# Patient Record
Sex: FEMALE | Race: WHITE
[De-identification: names, ages, dates, MRNs, and addresses within clinical notes are randomized per-mention and may not be internally consistent; named-entity substitution may affect disease eponyms.]

---

## 2020-12-01 ENCOUNTER — HOSPITAL ENCOUNTER (OUTPATIENT)
Dept: HOSPITAL 95 - PLD | Age: 80
Discharge: HOME | End: 2020-12-01
Attending: DERMATOLOGY
Payer: MEDICARE

## 2020-12-01 DIAGNOSIS — D22.71: Primary | ICD-10-CM

## 2021-03-04 ENCOUNTER — HOSPITAL ENCOUNTER (EMERGENCY)
Dept: HOSPITAL 95 - ER | Age: 81
Discharge: HOME | End: 2021-03-04
Payer: MEDICARE

## 2021-03-04 VITALS — HEIGHT: 66 IN | BODY MASS INDEX: 27.32 KG/M2 | WEIGHT: 170 LBS

## 2021-03-04 DIAGNOSIS — Z79.82: ICD-10-CM

## 2021-03-04 DIAGNOSIS — Z79.899: ICD-10-CM

## 2021-03-04 DIAGNOSIS — E78.5: ICD-10-CM

## 2021-03-04 DIAGNOSIS — R07.9: Primary | ICD-10-CM

## 2021-03-04 DIAGNOSIS — I25.10: ICD-10-CM

## 2021-03-04 DIAGNOSIS — Z88.8: ICD-10-CM

## 2021-03-04 DIAGNOSIS — I10: ICD-10-CM

## 2021-03-04 DIAGNOSIS — E03.9: ICD-10-CM

## 2021-03-04 LAB
ALBUMIN SERPL BCP-MCNC: 3.7 G/DL (ref 3.4–5)
ALBUMIN/GLOB SERPL: 1 {RATIO} (ref 0.8–1.8)
ALT SERPL W P-5'-P-CCNC: 18 U/L (ref 12–78)
ANION GAP SERPL CALCULATED.4IONS-SCNC: 6 MMOL/L (ref 6–16)
AST SERPL W P-5'-P-CCNC: 18 U/L (ref 12–37)
BASOPHILS # BLD AUTO: 0.04 K/MM3 (ref 0–0.23)
BASOPHILS NFR BLD AUTO: 1 % (ref 0–2)
BILIRUB SERPL-MCNC: 0.3 MG/DL (ref 0.1–1)
BUN SERPL-MCNC: 19 MG/DL (ref 8–24)
CALCIUM SERPL-MCNC: 10.1 MG/DL (ref 8.5–10.1)
CHLORIDE SERPL-SCNC: 106 MMOL/L (ref 98–108)
CO2 SERPL-SCNC: 29 MMOL/L (ref 21–32)
CREAT SERPL-MCNC: 0.75 MG/DL (ref 0.4–1)
DEPRECATED RDW RBC AUTO: 47.3 FL (ref 35.1–46.3)
EOSINOPHIL # BLD AUTO: 0.18 K/MM3 (ref 0–0.68)
EOSINOPHIL NFR BLD AUTO: 2 % (ref 0–6)
ERYTHROCYTE [DISTWIDTH] IN BLOOD BY AUTOMATED COUNT: 13.6 % (ref 11.7–14.2)
GLOBULIN SER CALC-MCNC: 3.6 G/DL (ref 2.2–4)
GLUCOSE SERPL-MCNC: 88 MG/DL (ref 70–99)
HCT VFR BLD AUTO: 42.1 % (ref 33–51)
HGB BLD-MCNC: 13.7 G/DL (ref 11.5–16)
IMM GRANULOCYTES # BLD AUTO: 0.02 K/MM3 (ref 0–0.1)
IMM GRANULOCYTES NFR BLD AUTO: 0 % (ref 0–1)
LYMPHOCYTES # BLD AUTO: 3.99 K/MM3 (ref 0.84–5.2)
LYMPHOCYTES NFR BLD AUTO: 47 % (ref 21–46)
MCHC RBC AUTO-ENTMCNC: 32.5 G/DL (ref 31.5–36.5)
MCV RBC AUTO: 94 FL (ref 80–100)
MONOCYTES # BLD AUTO: 0.68 K/MM3 (ref 0.16–1.47)
MONOCYTES NFR BLD AUTO: 8 % (ref 4–13)
NEUTROPHILS # BLD AUTO: 3.64 K/MM3 (ref 1.96–9.15)
NEUTROPHILS NFR BLD AUTO: 43 % (ref 41–73)
NRBC # BLD AUTO: 0 K/MM3 (ref 0–0.02)
NRBC BLD AUTO-RTO: 0 /100 WBC (ref 0–0.2)
PLATELET # BLD AUTO: 275 K/MM3 (ref 150–400)
POTASSIUM SERPL-SCNC: 3.8 MMOL/L (ref 3.5–5.5)
PROT SERPL-MCNC: 7.3 G/DL (ref 6.4–8.2)
SODIUM SERPL-SCNC: 141 MMOL/L (ref 136–145)
TROPONIN I SERPL-MCNC: <0.015 NG/ML (ref 0–0.04)

## 2022-01-05 ENCOUNTER — HOSPITAL ENCOUNTER (EMERGENCY)
Dept: HOSPITAL 95 - ER | Age: 82
LOS: 1 days | Discharge: HOME | End: 2022-01-06
Payer: MEDICARE

## 2022-01-05 VITALS — BODY MASS INDEX: 26.52 KG/M2 | WEIGHT: 164.99 LBS | HEIGHT: 66 IN

## 2022-01-05 DIAGNOSIS — Z88.8: ICD-10-CM

## 2022-01-05 DIAGNOSIS — I10: ICD-10-CM

## 2022-01-05 DIAGNOSIS — Z86.73: ICD-10-CM

## 2022-01-05 DIAGNOSIS — Z79.82: ICD-10-CM

## 2022-01-05 DIAGNOSIS — Z79.899: ICD-10-CM

## 2022-01-05 DIAGNOSIS — E03.9: ICD-10-CM

## 2022-01-05 DIAGNOSIS — N12: Primary | ICD-10-CM

## 2022-01-05 LAB
ALBUMIN SERPL BCP-MCNC: 3.5 G/DL (ref 3.4–5)
ALBUMIN/GLOB SERPL: 0.9 {RATIO} (ref 0.8–1.8)
ALT SERPL W P-5'-P-CCNC: 16 U/L (ref 12–78)
ANION GAP SERPL CALCULATED.4IONS-SCNC: 7 MMOL/L (ref 6–16)
AST SERPL W P-5'-P-CCNC: 25 U/L (ref 12–37)
BASOPHILS # BLD AUTO: 0.03 K/MM3 (ref 0–0.23)
BASOPHILS NFR BLD AUTO: 0 % (ref 0–2)
BILIRUB SERPL-MCNC: 0.4 MG/DL (ref 0.1–1)
BUN SERPL-MCNC: 22 MG/DL (ref 8–24)
CALCIUM SERPL-MCNC: 9.8 MG/DL (ref 8.5–10.1)
CHLORIDE SERPL-SCNC: 107 MMOL/L (ref 98–108)
CO2 SERPL-SCNC: 28 MMOL/L (ref 21–32)
CREAT SERPL-MCNC: 0.7 MG/DL (ref 0.4–1)
DEPRECATED RDW RBC AUTO: 48.8 FL (ref 35.1–46.3)
EOSINOPHIL # BLD AUTO: 0.07 K/MM3 (ref 0–0.68)
EOSINOPHIL NFR BLD AUTO: 1 % (ref 0–6)
ERYTHROCYTE [DISTWIDTH] IN BLOOD BY AUTOMATED COUNT: 14 % (ref 11.7–14.2)
GLOBULIN SER CALC-MCNC: 3.9 G/DL (ref 2.2–4)
GLUCOSE SERPL-MCNC: 113 MG/DL (ref 70–99)
HCT VFR BLD AUTO: 43.6 % (ref 33–51)
HGB BLD-MCNC: 13.9 G/DL (ref 11.5–16)
IMM GRANULOCYTES # BLD AUTO: 0.05 K/MM3 (ref 0–0.1)
IMM GRANULOCYTES NFR BLD AUTO: 1 % (ref 0–1)
LYMPHOCYTES # BLD AUTO: 3.5 K/MM3 (ref 0.84–5.2)
LYMPHOCYTES NFR BLD AUTO: 37 % (ref 21–46)
MCHC RBC AUTO-ENTMCNC: 31.9 G/DL (ref 31.5–36.5)
MCV RBC AUTO: 95 FL (ref 80–100)
MONOCYTES # BLD AUTO: 0.77 K/MM3 (ref 0.16–1.47)
MONOCYTES NFR BLD AUTO: 8 % (ref 4–13)
NEUTROPHILS # BLD AUTO: 4.99 K/MM3 (ref 1.96–9.15)
NEUTROPHILS NFR BLD AUTO: 53 % (ref 41–73)
NRBC # BLD AUTO: 0 K/MM3 (ref 0–0.02)
NRBC BLD AUTO-RTO: 0 /100 WBC (ref 0–0.2)
PLATELET # BLD AUTO: 316 K/MM3 (ref 150–400)
POTASSIUM SERPL-SCNC: 4 MMOL/L (ref 3.5–5.5)
PROT SERPL-MCNC: 7.4 G/DL (ref 6.4–8.2)
PROT UR STRIP-MCNC: (no result) MG/DL
RBC #/AREA URNS HPF: (no result) /HPF (ref 0–2)
SODIUM SERPL-SCNC: 142 MMOL/L (ref 136–145)
SP GR SPEC: 1.03 (ref 1–1.02)
UROBILINOGEN UR STRIP-MCNC: (no result) MG/DL
WBC #/AREA URNS HPF: (no result) /HPF (ref 0–5)

## 2022-01-05 PROCEDURE — A9270 NON-COVERED ITEM OR SERVICE: HCPCS

## 2022-04-25 ENCOUNTER — HOSPITAL ENCOUNTER (OUTPATIENT)
Dept: HOSPITAL 95 - ORSCMMR | Age: 82
LOS: 1 days | Discharge: HOME | End: 2022-04-26
Attending: OBSTETRICS & GYNECOLOGY
Payer: MEDICARE

## 2022-04-25 VITALS — BODY MASS INDEX: 27.49 KG/M2 | HEIGHT: 66 IN | WEIGHT: 171.08 LBS

## 2022-04-25 DIAGNOSIS — N81.5: ICD-10-CM

## 2022-04-25 DIAGNOSIS — N81.10: Primary | ICD-10-CM

## 2022-04-25 DIAGNOSIS — N81.6: ICD-10-CM

## 2022-04-25 DIAGNOSIS — K21.9: ICD-10-CM

## 2022-04-25 DIAGNOSIS — I10: ICD-10-CM

## 2022-04-25 DIAGNOSIS — Z79.899: ICD-10-CM

## 2022-04-25 DIAGNOSIS — E03.9: ICD-10-CM

## 2022-04-25 DIAGNOSIS — G47.33: ICD-10-CM

## 2022-04-25 DIAGNOSIS — I25.2: ICD-10-CM

## 2022-04-25 PROCEDURE — 0UQF0ZZ REPAIR CUL-DE-SAC, OPEN APPROACH: ICD-10-PCS | Performed by: OBSTETRICS & GYNECOLOGY

## 2022-04-25 PROCEDURE — 0JQC0ZZ REPAIR PELVIC REGION SUBCUTANEOUS TISSUE AND FASCIA, OPEN APPROACH: ICD-10-PCS | Performed by: OBSTETRICS & GYNECOLOGY

## 2022-04-25 PROCEDURE — A9270 NON-COVERED ITEM OR SERVICE: HCPCS

## 2022-04-25 PROCEDURE — C1771 REP DEV, URINARY, W/SLING: HCPCS

## 2022-04-25 NOTE — NUR
MEDICATED FOR C/O 6/10 PAIN ON LOWER ABD, KPAD GIVEN, TOLERATING CLEAR LIQUIDS
AND SOME REGULAR FOOD WELL, SMALL AMOUNT OF SEROUSANGUINOUS DRAINAGE NOTED ON
PERIPAD, INMAN CATH IN PLACE DRAINING C/Y/U, VSS, CONT. TO MONITOR FOR ANY
CHANGES.

## 2022-04-26 NOTE — NUR
POD 1 S/P A&P REPAIR. PT VSS, PAIN MGD PER EMAR. PT HAD SMALL AMT VAGINAL
BLEEDING, CALVIN PAD CHANGED X2. PT SHANNON REG PO, NO N/V, REP +FLATUS. INMAN D/C
THIS AM, AWAITING VOIDING TRIAL.

## 2022-04-26 NOTE — NUR
WRITTEN AND VERBAL DISCHARGE INSTRUCTIONS PROVIDED, PT REPORTS UNDERSTANDING.
QUESTIONS ANSWERED.  WILL MONITOR UNTIL DISCHARGE.

## 2022-04-26 NOTE — NUR
DISCHARGE
PT DISCHARGED HOME WITH FAMILY MEMBER AT 1345.  PT ALERT AND ORIENTED AT TIME
OF DISCHARGE.  SHE WAS ABLE TO VOID WITH PVR OF 11ML AFTER VOID.  PT ABLE TO
AMBULATE.  TOLERATING PO AND PAIN MANAGED BEFORE DISCHARGE.  PT ESCORTED OUT
IN A W/C.

## 2022-04-26 NOTE — NUR
PATIENT A&0X4 VSS, PT COOPERATIVE AND RECEPTIVE W/CARE PROVIDED. REPORTED
SHORT BOUT OF HEARTBURN AND STATED IT WAS FROM RECENT TOMATO JUICE. ABDOMEN
TENDER UPON PALPATION. REPORTED MODERATE TO SEVERE PAIN T/O SHIFT, MEDICATED
PER EMAR AND GAYLE RASCON. ABLE TO AMBULATE WITH ASSISTANCE. URINARY CATH
DRAINING TRANSPARENT YELLOW URINE TO GRAVITY UNTIL DC'D @ 0530. PATIENT IS
CURRENTLY REPORTING COMFORT WHILE RESTING IN LOW PERKINS'S, CALL LIGHT WITHIN
REACH, WILL CONTINUE TO MONITOR UNTIL REPORT GIVEN TO DAY SHIFT.

## 2022-04-26 NOTE — NUR
SHIFT ASSESSMENT
SHIFT ASSESSMENT DOCUMENTATION BY TANA STUDENT NURSE REVIEWED AND THIS RN
AGREES WITH THAT DOCUMENTATION.

## 2022-04-26 NOTE — NUR
Pt. is in bed and awake. Pt. welcomes my visit. Pt. is pleasant and displays
evidence of jacey regarding her likely discharge today. Estabish rapport and
identify mutual friends and family. Pt. verbalizes jacey for her supportive
family, and her commitment to care for her 95yr. old mother-in-law. Prayed
with Pt. by holding her hand. Pt. displayed evidence of a delighted spirit.
Pt. verbalized gratitude for the spiritual care visit.

## 2022-05-24 ENCOUNTER — HOSPITAL ENCOUNTER (EMERGENCY)
Dept: HOSPITAL 95 - ER | Age: 82
Discharge: HOME | End: 2022-05-24
Payer: MEDICARE

## 2022-05-24 VITALS — WEIGHT: 167.99 LBS | HEIGHT: 66 IN | BODY MASS INDEX: 27 KG/M2

## 2022-05-24 DIAGNOSIS — Z88.8: ICD-10-CM

## 2022-05-24 DIAGNOSIS — E03.9: ICD-10-CM

## 2022-05-24 DIAGNOSIS — G45.9: Primary | ICD-10-CM

## 2022-05-24 DIAGNOSIS — R07.89: ICD-10-CM

## 2022-05-24 DIAGNOSIS — I10: ICD-10-CM

## 2022-05-24 DIAGNOSIS — Z79.899: ICD-10-CM

## 2022-05-24 LAB
ALBUMIN SERPL BCP-MCNC: 4 G/DL (ref 3.4–5)
ALBUMIN/GLOB SERPL: 1.1 {RATIO} (ref 0.8–1.8)
ALT SERPL W P-5'-P-CCNC: 15 U/L (ref 12–78)
ANION GAP SERPL CALCULATED.4IONS-SCNC: 8 MMOL/L (ref 6–16)
AST SERPL W P-5'-P-CCNC: 23 U/L (ref 12–37)
BASOPHILS # BLD AUTO: 0.07 K/MM3 (ref 0–0.23)
BASOPHILS NFR BLD AUTO: 1 % (ref 0–2)
BILIRUB SERPL-MCNC: 0.7 MG/DL (ref 0.1–1)
BUN SERPL-MCNC: 11 MG/DL (ref 8–24)
CALCIUM SERPL-MCNC: 10.2 MG/DL (ref 8.5–10.1)
CHLORIDE SERPL-SCNC: 107 MMOL/L (ref 98–108)
CO2 SERPL-SCNC: 26 MMOL/L (ref 21–32)
CREAT SERPL-MCNC: 0.65 MG/DL (ref 0.4–1)
DEPRECATED RDW RBC AUTO: 48.7 FL (ref 35.1–46.3)
EOSINOPHIL # BLD AUTO: 0.21 K/MM3 (ref 0–0.68)
EOSINOPHIL NFR BLD AUTO: 3 % (ref 0–6)
ERYTHROCYTE [DISTWIDTH] IN BLOOD BY AUTOMATED COUNT: 14.1 % (ref 11.7–14.2)
GLOBULIN SER CALC-MCNC: 3.8 G/DL (ref 2.2–4)
GLUCOSE SERPL-MCNC: 110 MG/DL (ref 70–99)
HCT VFR BLD AUTO: 45.4 % (ref 33–51)
HGB BLD-MCNC: 14.8 G/DL (ref 11.5–16)
IMM GRANULOCYTES # BLD AUTO: 0.03 K/MM3 (ref 0–0.1)
IMM GRANULOCYTES NFR BLD AUTO: 0 % (ref 0–1)
LYMPHOCYTES # BLD AUTO: 3.38 K/MM3 (ref 0.84–5.2)
LYMPHOCYTES NFR BLD AUTO: 42 % (ref 21–46)
MCHC RBC AUTO-ENTMCNC: 32.6 G/DL (ref 31.5–36.5)
MCV RBC AUTO: 93 FL (ref 80–100)
MONOCYTES # BLD AUTO: 0.59 K/MM3 (ref 0.16–1.47)
MONOCYTES NFR BLD AUTO: 7 % (ref 4–13)
NEUTROPHILS # BLD AUTO: 3.77 K/MM3 (ref 1.96–9.15)
NEUTROPHILS NFR BLD AUTO: 47 % (ref 41–73)
NRBC # BLD AUTO: 0 K/MM3 (ref 0–0.02)
NRBC BLD AUTO-RTO: 0 /100 WBC (ref 0–0.2)
PLATELET # BLD AUTO: 306 K/MM3 (ref 150–400)
POTASSIUM SERPL-SCNC: 3.8 MMOL/L (ref 3.5–5.5)
PROT SERPL-MCNC: 7.8 G/DL (ref 6.4–8.2)
SODIUM SERPL-SCNC: 141 MMOL/L (ref 136–145)

## 2022-05-24 PROCEDURE — A9270 NON-COVERED ITEM OR SERVICE: HCPCS

## 2022-06-09 ENCOUNTER — HOSPITAL ENCOUNTER (OUTPATIENT)
Dept: HOSPITAL 95 - LAB SHORT | Age: 82
Discharge: HOME | End: 2022-06-09
Attending: OBSTETRICS & GYNECOLOGY
Payer: MEDICARE

## 2022-06-09 DIAGNOSIS — N39.41: Primary | ICD-10-CM

## 2022-06-09 LAB
KETONES UR STRIP-MCNC: (no result) MG/DL
LEUKOCYTE ESTERASE UR QL STRIP: (no result)
PROT UR STRIP-MCNC: (no result) MG/DL
RBC #/AREA URNS HPF: (no result) /HPF (ref 0–2)
SP GR SPEC: 1.02 (ref 1–1.02)
UROBILINOGEN UR STRIP-MCNC: (no result) MG/DL
WBC #/AREA URNS HPF: (no result) /HPF (ref 0–5)

## 2023-04-21 ENCOUNTER — HOSPITAL ENCOUNTER (OUTPATIENT)
Dept: HOSPITAL 95 - ORSCMMR | Age: 83
LOS: 1 days | Discharge: HOME | End: 2023-04-22
Attending: ORTHOPAEDIC SURGERY
Payer: MEDICARE

## 2023-04-21 VITALS — SYSTOLIC BLOOD PRESSURE: 140 MMHG | DIASTOLIC BLOOD PRESSURE: 66 MMHG

## 2023-04-21 VITALS — DIASTOLIC BLOOD PRESSURE: 82 MMHG | SYSTOLIC BLOOD PRESSURE: 123 MMHG

## 2023-04-21 VITALS — SYSTOLIC BLOOD PRESSURE: 115 MMHG | DIASTOLIC BLOOD PRESSURE: 53 MMHG

## 2023-04-21 VITALS — SYSTOLIC BLOOD PRESSURE: 150 MMHG | DIASTOLIC BLOOD PRESSURE: 78 MMHG

## 2023-04-21 VITALS — WEIGHT: 158.73 LBS | HEIGHT: 66 IN | BODY MASS INDEX: 25.51 KG/M2

## 2023-04-21 VITALS — DIASTOLIC BLOOD PRESSURE: 59 MMHG | SYSTOLIC BLOOD PRESSURE: 108 MMHG

## 2023-04-21 VITALS — SYSTOLIC BLOOD PRESSURE: 110 MMHG | DIASTOLIC BLOOD PRESSURE: 58 MMHG

## 2023-04-21 VITALS — DIASTOLIC BLOOD PRESSURE: 57 MMHG | SYSTOLIC BLOOD PRESSURE: 113 MMHG

## 2023-04-21 VITALS — DIASTOLIC BLOOD PRESSURE: 52 MMHG | SYSTOLIC BLOOD PRESSURE: 82 MMHG

## 2023-04-21 VITALS — SYSTOLIC BLOOD PRESSURE: 124 MMHG | DIASTOLIC BLOOD PRESSURE: 57 MMHG

## 2023-04-21 VITALS — DIASTOLIC BLOOD PRESSURE: 55 MMHG | SYSTOLIC BLOOD PRESSURE: 115 MMHG

## 2023-04-21 VITALS — DIASTOLIC BLOOD PRESSURE: 68 MMHG | SYSTOLIC BLOOD PRESSURE: 113 MMHG

## 2023-04-21 VITALS — DIASTOLIC BLOOD PRESSURE: 67 MMHG | SYSTOLIC BLOOD PRESSURE: 129 MMHG

## 2023-04-21 VITALS — DIASTOLIC BLOOD PRESSURE: 70 MMHG | SYSTOLIC BLOOD PRESSURE: 137 MMHG

## 2023-04-21 VITALS — SYSTOLIC BLOOD PRESSURE: 118 MMHG | DIASTOLIC BLOOD PRESSURE: 72 MMHG

## 2023-04-21 VITALS — SYSTOLIC BLOOD PRESSURE: 100 MMHG | DIASTOLIC BLOOD PRESSURE: 50 MMHG

## 2023-04-21 DIAGNOSIS — E78.5: ICD-10-CM

## 2023-04-21 DIAGNOSIS — M17.12: Primary | ICD-10-CM

## 2023-04-21 DIAGNOSIS — Z86.73: ICD-10-CM

## 2023-04-21 DIAGNOSIS — I10: ICD-10-CM

## 2023-04-21 DIAGNOSIS — Z79.899: ICD-10-CM

## 2023-04-21 PROCEDURE — A9270 NON-COVERED ITEM OR SERVICE: HCPCS

## 2023-04-21 PROCEDURE — 20985 CPTR-ASST DIR MS PX: CPT

## 2023-04-21 PROCEDURE — 27447 TOTAL KNEE ARTHROPLASTY: CPT

## 2023-04-21 PROCEDURE — C1776 JOINT DEVICE (IMPLANTABLE): HCPCS

## 2023-04-21 PROCEDURE — 0SRD0JA REPLACEMENT OF LEFT KNEE JOINT WITH SYNTHETIC SUBSTITUTE, UNCEMENTED, OPEN APPROACH: ICD-10-PCS | Performed by: ORTHOPAEDIC SURGERY

## 2023-04-21 PROCEDURE — 8E0Y0CZ ROBOTIC ASSISTED PROCEDURE OF LOWER EXTREMITY, OPEN APPROACH: ICD-10-PCS | Performed by: ORTHOPAEDIC SURGERY

## 2023-04-21 NOTE — NUR
SHIFT SUMMARY:
POD 0 LEFT TKA
PATIENT IS A&OX4. VS ARE WNL AND IS ON RA. PATIENT REPORTS A 4/10 PAIN ON THE
LEFT KNEE AT THIS TIME AND HAS BEEN GIVEN PO TYLENOL AND OXY. SHE STATES "I'M
STILL PRETTY NUMB BUT I CAN MOVE/WIGGLE MY FEET AND I CAN FEEL THEM SLOWLY
WAKING UP!". LEFT KNEE HAS X2 AQUACEL THAT IS C/D/I. PEDAL PULSES ARE STRONG
AND WARM TO THE TOUCH. SHE IS TOLERATING PO INTAKE. SHE IS LAYING IN BED WITH
CALL LIGHT IN REACH.

## 2023-04-21 NOTE — NUR
PATIENT WAS BLADDER SCANNED AND IT  ML. PATIENT THEN HAD THE SUDDEN
URGE TO URINATE. WHEN PATIENT WAS A SBA WITH FWW AND GAIT BELT TO THE BATHROOM
AND THEN BACK TO THE RECLINER. PATIENT URINATED ON MAJORITY OF THE BED, THE
DRIBBLED WHEN WALKING TO THE BATHROOM, AND THEN VOIDED MORE IN THE TOLIET.
PATIENT STATES "I FEEL A LOT MORE RELIEVED NOW!". URINE WAS LIGHT YELLOW
COLORED. SHE IS IN THE RECLINER WITH LEGS ELEVATED AND POLAR PACK IN PLACE.
CALL LIGHT WITHIN REACH.

## 2023-04-21 NOTE — NUR
PATIENT JUST CAME BACK FROM PACU TODAY AT 1300.
POD 0 LEFT TKA
PATIENT IS A&OX4. VS ARE WNL AND IS ON RA. SHE HAD A SPINAL DURING THE
PROCEDURE AND CAN'T FEEL FROM HER HIPS DOWN AT THIS TIME. PEDAL PULSES ARE
STRONG AND WARM TO TOUCH. HER LEFT KNEE HAS AN AQUACEL WITH THE POLAR PACK IN
PLACE THAT IS C/D/I. SHE IS TOLERATING SMALL AMOUNTS OF PO INTAKE AT THIS
TIME. SON IS AT BEDSIDE AND THIS NURSE GAVE HIM THE HARD PERSCRIPTIONS TO HAVE
FILLED OUT. PATIENT IS LAYING IN BED WITH CALL LIGHT IN REACH.

## 2023-04-21 NOTE — NUR
Ambulatory in Day Surgery WITH STEADY GAIT.
Surgical site prepped with 2% Chlorhexidine cloth wipe.
History, Chart, Medications and Allergies reviewed before start of
procedure. Lungs clear T/O to Auscultation.
Patient confirms NPO status and agrees with scheduled surgery.
Pre-Op teaching done. Pt verbalizes understanding.

## 2023-04-22 VITALS — DIASTOLIC BLOOD PRESSURE: 59 MMHG | SYSTOLIC BLOOD PRESSURE: 128 MMHG

## 2023-04-22 VITALS — DIASTOLIC BLOOD PRESSURE: 52 MMHG | SYSTOLIC BLOOD PRESSURE: 99 MMHG

## 2023-04-22 VITALS — SYSTOLIC BLOOD PRESSURE: 100 MMHG | DIASTOLIC BLOOD PRESSURE: 57 MMHG

## 2023-04-22 LAB
ANION GAP SERPL CALCULATED.4IONS-SCNC: 1 MMOL/L (ref 6–16)
BASOPHILS # BLD AUTO: 0.02 K/MM3 (ref 0–0.23)
BASOPHILS NFR BLD AUTO: 0 % (ref 0–2)
BUN SERPL-MCNC: 12 MG/DL (ref 8–24)
CALCIUM SERPL-MCNC: 9.5 MG/DL (ref 8.5–10.1)
CHLORIDE SERPL-SCNC: 107 MMOL/L (ref 98–108)
CO2 SERPL-SCNC: 30 MMOL/L (ref 21–32)
CREAT SERPL-MCNC: 0.74 MG/DL (ref 0.4–1)
DEPRECATED RDW RBC AUTO: 48.2 FL (ref 35.1–46.3)
EOSINOPHIL # BLD AUTO: 0 K/MM3 (ref 0–0.68)
EOSINOPHIL NFR BLD AUTO: 0 % (ref 0–6)
ERYTHROCYTE [DISTWIDTH] IN BLOOD BY AUTOMATED COUNT: 14.2 % (ref 11.7–14.2)
GLUCOSE SERPL-MCNC: 129 MG/DL (ref 70–99)
HCT VFR BLD AUTO: 35 % (ref 33–51)
HGB BLD-MCNC: 11.2 G/DL (ref 11.5–16)
IMM GRANULOCYTES # BLD AUTO: 0.03 K/MM3 (ref 0–0.1)
IMM GRANULOCYTES NFR BLD AUTO: 0 % (ref 0–1)
LYMPHOCYTES # BLD AUTO: 1.63 K/MM3 (ref 0.84–5.2)
LYMPHOCYTES NFR BLD AUTO: 19 % (ref 21–46)
MCHC RBC AUTO-ENTMCNC: 32 G/DL (ref 31.5–36.5)
MCV RBC AUTO: 92 FL (ref 80–100)
MONOCYTES # BLD AUTO: 0.53 K/MM3 (ref 0.16–1.47)
MONOCYTES NFR BLD AUTO: 6 % (ref 4–13)
NEUTROPHILS # BLD AUTO: 6.49 K/MM3 (ref 1.96–9.15)
NEUTROPHILS NFR BLD AUTO: 75 % (ref 41–73)
NRBC # BLD AUTO: 0 K/MM3 (ref 0–0.02)
NRBC BLD AUTO-RTO: 0 /100 WBC (ref 0–0.2)
PLATELET # BLD AUTO: 230 K/MM3 (ref 150–400)
POTASSIUM SERPL-SCNC: 4.3 MMOL/L (ref 3.5–5.5)
SODIUM SERPL-SCNC: 138 MMOL/L (ref 136–145)

## 2023-04-22 NOTE — NUR
DISCHARGE SUMMARY
POD 1 L TOTAL KNEE. A&OX4. O2 SAT >95% ON RA. VOIDING AND
PASSING GAS. REMOVED IV ACCESS, NO OTHER DEVICES IN PLACE.
PAIN MANAGED WITHIN ACCEPTABLE LIMITS. PT CLEARED
EVAL FROM PT. AMBULATING WITH FRONT WHEELED WALKER. DRESSING DRY, CLEAN,
INTACT WITH NO SIGNS OF DRAINAGE. ADDITIONAL AQUACEL DRESSING PROVIDED TO PT.
EDUCATION GIVEN ON KEEPING KNEE ELEVATED. PT WILL FOLLOW UP WITH DR IN TWO
WEEKS. ALL BELONGINGS WITH PT. LEFT VIA WHEELCHAIR TO PRIVATE AUTO.

## 2023-04-22 NOTE — NUR
NURSING STUDENT DOCUMENTATION
REVIEWED ALL DOCUMENTATION COMPLETE BY NURSING STUDENT TODAY AND AGREE WITH
HER ASSESSMENT OF THE PATIENT.

## 2023-08-10 ENCOUNTER — HOSPITAL ENCOUNTER (OUTPATIENT)
Dept: HOSPITAL 95 - LAB SHORT | Age: 83
End: 2023-08-10
Attending: STUDENT IN AN ORGANIZED HEALTH CARE EDUCATION/TRAINING PROGRAM
Payer: MEDICARE

## 2023-08-10 DIAGNOSIS — M13.0: Primary | ICD-10-CM

## 2023-08-12 LAB — ANTI-DSDNA ANTIBODIES: <1 IU/ML (ref 0–9)

## 2023-08-15 ENCOUNTER — HOSPITAL ENCOUNTER (OUTPATIENT)
Dept: HOSPITAL 95 - LAB SHORT | Age: 83
Discharge: HOME | End: 2023-08-15
Attending: STUDENT IN AN ORGANIZED HEALTH CARE EDUCATION/TRAINING PROGRAM
Payer: MEDICARE

## 2023-08-15 DIAGNOSIS — M13.0: Primary | ICD-10-CM

## 2023-08-15 DIAGNOSIS — I10: ICD-10-CM

## 2023-08-15 DIAGNOSIS — E03.8: ICD-10-CM

## 2023-08-15 LAB
ANION GAP SERPL CALCULATED.4IONS-SCNC: 7 MMOL/L (ref 6–16)
BUN SERPL-MCNC: 16 MG/DL (ref 8–24)
CALCIUM SERPL-MCNC: 9.7 MG/DL (ref 8.5–10.1)
CHLORIDE SERPL-SCNC: 103 MMOL/L (ref 98–108)
CO2 SERPL-SCNC: 29 MMOL/L (ref 21–32)
CREAT SERPL-MCNC: 0.7 MG/DL (ref 0.4–1)
GLUCOSE SERPL-MCNC: 92 MG/DL (ref 70–99)
POTASSIUM SERPL-SCNC: 3.4 MMOL/L (ref 3.5–5.5)
SODIUM SERPL-SCNC: 139 MMOL/L (ref 136–145)
TSH SERPL DL<=0.005 MIU/L-ACNC: 1.3 UIU/ML (ref 0.36–4.8)

## 2023-10-21 ENCOUNTER — HOSPITAL ENCOUNTER (EMERGENCY)
Dept: HOSPITAL 95 - ER | Age: 83
Discharge: HOME | End: 2023-10-21
Payer: MEDICARE

## 2023-10-21 VITALS — BODY MASS INDEX: 26.52 KG/M2 | HEIGHT: 66 IN | WEIGHT: 164.99 LBS

## 2023-10-21 VITALS — DIASTOLIC BLOOD PRESSURE: 121 MMHG | SYSTOLIC BLOOD PRESSURE: 156 MMHG

## 2023-10-21 DIAGNOSIS — I10: ICD-10-CM

## 2023-10-21 DIAGNOSIS — B34.9: ICD-10-CM

## 2023-10-21 DIAGNOSIS — Z79.82: ICD-10-CM

## 2023-10-21 DIAGNOSIS — Z88.8: ICD-10-CM

## 2023-10-21 DIAGNOSIS — E03.9: ICD-10-CM

## 2023-10-21 DIAGNOSIS — E86.0: ICD-10-CM

## 2023-10-21 DIAGNOSIS — Z20.822: ICD-10-CM

## 2023-10-21 DIAGNOSIS — R55: Primary | ICD-10-CM

## 2023-10-21 LAB
ALBUMIN SERPL BCP-MCNC: 4 G/DL (ref 3.4–5)
ALBUMIN/GLOB SERPL: 1.1 {RATIO} (ref 0.8–1.8)
ALT SERPL W P-5'-P-CCNC: 14 U/L (ref 12–78)
ANION GAP SERPL CALCULATED.4IONS-SCNC: 7 MMOL/L (ref 6–16)
AST SERPL W P-5'-P-CCNC: 20 U/L (ref 12–37)
BASOPHILS # BLD AUTO: 0.04 K/MM3 (ref 0–0.23)
BASOPHILS NFR BLD AUTO: 1 % (ref 0–2)
BILIRUB SERPL-MCNC: 0.6 MG/DL (ref 0.1–1)
BUN SERPL-MCNC: 8 MG/DL (ref 8–24)
CALCIUM SERPL-MCNC: 9.6 MG/DL (ref 8.5–10.1)
CHLORIDE SERPL-SCNC: 110 MMOL/L (ref 98–108)
CO2 SERPL-SCNC: 26 MMOL/L (ref 21–32)
CREAT SERPL-MCNC: 0.68 MG/DL (ref 0.4–1)
DEPRECATED RDW RBC AUTO: 50.7 FL (ref 35.1–46.3)
EOSINOPHIL # BLD AUTO: 0.1 K/MM3 (ref 0–0.68)
EOSINOPHIL NFR BLD AUTO: 2 % (ref 0–6)
ERYTHROCYTE [DISTWIDTH] IN BLOOD BY AUTOMATED COUNT: 14.7 % (ref 11.7–14.2)
FLUAV RNA SPEC QL NAA+PROBE: NEGATIVE
FLUBV RNA SPEC QL NAA+PROBE: NEGATIVE
GLOBULIN SER CALC-MCNC: 3.8 G/DL (ref 2.2–4)
GLUCOSE SERPL-MCNC: 102 MG/DL (ref 70–99)
HCT VFR BLD AUTO: 41.1 % (ref 33–51)
HGB BLD-MCNC: 13.4 G/DL (ref 11.5–16)
IMM GRANULOCYTES # BLD AUTO: 0.01 K/MM3 (ref 0–0.1)
IMM GRANULOCYTES NFR BLD AUTO: 0 % (ref 0–1)
LYMPHOCYTES # BLD AUTO: 2.71 K/MM3 (ref 0.84–5.2)
LYMPHOCYTES NFR BLD AUTO: 43 % (ref 21–46)
MCHC RBC AUTO-ENTMCNC: 32.6 G/DL (ref 31.5–36.5)
MCV RBC AUTO: 93 FL (ref 80–100)
MONOCYTES # BLD AUTO: 0.41 K/MM3 (ref 0.16–1.47)
MONOCYTES NFR BLD AUTO: 7 % (ref 4–13)
NEUTROPHILS # BLD AUTO: 3.06 K/MM3 (ref 1.96–9.15)
NEUTROPHILS NFR BLD AUTO: 48 % (ref 41–73)
NRBC # BLD AUTO: 0 K/MM3 (ref 0–0.02)
NRBC BLD AUTO-RTO: 0 /100 WBC (ref 0–0.2)
PLATELET # BLD AUTO: 284 K/MM3 (ref 150–400)
POTASSIUM SERPL-SCNC: 3.5 MMOL/L (ref 3.5–5.5)
PROT SERPL-MCNC: 7.8 G/DL (ref 6.4–8.2)
RSV RNA SPEC QL NAA+PROBE: NEGATIVE
SARS-COV-2 RNA RESP QL NAA+PROBE: NEGATIVE
SODIUM SERPL-SCNC: 143 MMOL/L (ref 136–145)

## 2023-10-24 ENCOUNTER — HOSPITAL ENCOUNTER (OUTPATIENT)
Dept: HOSPITAL 95 - ORSCMMR | Age: 83
LOS: 2 days | Discharge: HOME | End: 2023-10-26
Attending: ORTHOPAEDIC SURGERY
Payer: MEDICARE

## 2023-10-24 VITALS — SYSTOLIC BLOOD PRESSURE: 109 MMHG | DIASTOLIC BLOOD PRESSURE: 62 MMHG

## 2023-10-24 VITALS — DIASTOLIC BLOOD PRESSURE: 59 MMHG | SYSTOLIC BLOOD PRESSURE: 134 MMHG

## 2023-10-24 VITALS — SYSTOLIC BLOOD PRESSURE: 112 MMHG | DIASTOLIC BLOOD PRESSURE: 61 MMHG

## 2023-10-24 VITALS — SYSTOLIC BLOOD PRESSURE: 116 MMHG | DIASTOLIC BLOOD PRESSURE: 65 MMHG

## 2023-10-24 VITALS — SYSTOLIC BLOOD PRESSURE: 132 MMHG | DIASTOLIC BLOOD PRESSURE: 85 MMHG

## 2023-10-24 VITALS — DIASTOLIC BLOOD PRESSURE: 64 MMHG | SYSTOLIC BLOOD PRESSURE: 127 MMHG

## 2023-10-24 VITALS — SYSTOLIC BLOOD PRESSURE: 120 MMHG | DIASTOLIC BLOOD PRESSURE: 62 MMHG

## 2023-10-24 VITALS — WEIGHT: 158.51 LBS | HEIGHT: 65.75 IN | BODY MASS INDEX: 25.78 KG/M2

## 2023-10-24 VITALS — SYSTOLIC BLOOD PRESSURE: 114 MMHG | DIASTOLIC BLOOD PRESSURE: 63 MMHG

## 2023-10-24 VITALS — DIASTOLIC BLOOD PRESSURE: 55 MMHG | SYSTOLIC BLOOD PRESSURE: 104 MMHG

## 2023-10-24 VITALS — SYSTOLIC BLOOD PRESSURE: 134 MMHG | DIASTOLIC BLOOD PRESSURE: 64 MMHG

## 2023-10-24 VITALS — DIASTOLIC BLOOD PRESSURE: 62 MMHG | SYSTOLIC BLOOD PRESSURE: 103 MMHG

## 2023-10-24 VITALS — SYSTOLIC BLOOD PRESSURE: 112 MMHG | DIASTOLIC BLOOD PRESSURE: 80 MMHG

## 2023-10-24 VITALS — DIASTOLIC BLOOD PRESSURE: 86 MMHG | SYSTOLIC BLOOD PRESSURE: 150 MMHG

## 2023-10-24 VITALS — SYSTOLIC BLOOD PRESSURE: 105 MMHG | DIASTOLIC BLOOD PRESSURE: 71 MMHG

## 2023-10-24 VITALS — DIASTOLIC BLOOD PRESSURE: 58 MMHG | SYSTOLIC BLOOD PRESSURE: 129 MMHG

## 2023-10-24 VITALS — SYSTOLIC BLOOD PRESSURE: 132 MMHG | DIASTOLIC BLOOD PRESSURE: 55 MMHG

## 2023-10-24 VITALS — SYSTOLIC BLOOD PRESSURE: 151 MMHG | DIASTOLIC BLOOD PRESSURE: 75 MMHG

## 2023-10-24 DIAGNOSIS — Z79.899: ICD-10-CM

## 2023-10-24 DIAGNOSIS — Z96.643: ICD-10-CM

## 2023-10-24 DIAGNOSIS — I10: ICD-10-CM

## 2023-10-24 DIAGNOSIS — Z86.73: ICD-10-CM

## 2023-10-24 DIAGNOSIS — E78.5: ICD-10-CM

## 2023-10-24 DIAGNOSIS — M17.11: Primary | ICD-10-CM

## 2023-10-24 PROCEDURE — C1713 ANCHOR/SCREW BN/BN,TIS/BN: HCPCS

## 2023-10-24 PROCEDURE — 0SRC0JA REPLACEMENT OF RIGHT KNEE JOINT WITH SYNTHETIC SUBSTITUTE, UNCEMENTED, OPEN APPROACH: ICD-10-PCS | Performed by: ORTHOPAEDIC SURGERY

## 2023-10-24 PROCEDURE — A9270 NON-COVERED ITEM OR SERVICE: HCPCS

## 2023-10-24 PROCEDURE — C1776 JOINT DEVICE (IMPLANTABLE): HCPCS

## 2023-10-24 NOTE — NUR
SHIFT SUMMARY
PT HAS UNFORTUNATLY BEEN STRUGGLING w/ NAUSEA & DRY HEAVES AT X's THIS
AFTERNOON. HAS BEEN ABLE TO TAKE SIPS OF WATER & CRACKER's w/ PILLS. PAIN HAS
SPIKED & DID REQUIRE IV BREAKTHRU.

## 2023-10-24 NOTE — NUR
Ambulatory in Day Surgery w/sba.  History, Chart, Medications and Allergies
reviewed before start of procedure. Patient confirms NPO status and agrees
with scheduled surgery.  Surgical site prepped with 2% Chlorhexidine cloth
wipe.  Patient reports completing Chlorhexadine shower X2 prior to admission
to hospital. Pre-Op teaching done. Pt verbalizes understanding.

## 2023-10-25 VITALS — DIASTOLIC BLOOD PRESSURE: 63 MMHG | SYSTOLIC BLOOD PRESSURE: 131 MMHG

## 2023-10-25 VITALS — SYSTOLIC BLOOD PRESSURE: 147 MMHG | DIASTOLIC BLOOD PRESSURE: 57 MMHG

## 2023-10-25 VITALS — DIASTOLIC BLOOD PRESSURE: 56 MMHG | SYSTOLIC BLOOD PRESSURE: 126 MMHG

## 2023-10-25 VITALS — SYSTOLIC BLOOD PRESSURE: 135 MMHG | DIASTOLIC BLOOD PRESSURE: 62 MMHG

## 2023-10-25 VITALS — SYSTOLIC BLOOD PRESSURE: 131 MMHG | DIASTOLIC BLOOD PRESSURE: 56 MMHG

## 2023-10-25 LAB
ANION GAP SERPL CALCULATED.4IONS-SCNC: 7 MMOL/L (ref 6–16)
BASOPHILS # BLD AUTO: 0.02 K/MM3 (ref 0–0.23)
BASOPHILS NFR BLD AUTO: 0 % (ref 0–2)
BUN SERPL-MCNC: 14 MG/DL (ref 8–24)
CALCIUM SERPL-MCNC: 9.1 MG/DL (ref 8.5–10.1)
CHLORIDE SERPL-SCNC: 107 MMOL/L (ref 98–108)
CO2 SERPL-SCNC: 26 MMOL/L (ref 21–32)
CREAT SERPL-MCNC: 0.71 MG/DL (ref 0.4–1)
DEPRECATED RDW RBC AUTO: 50.4 FL (ref 35.1–46.3)
EOSINOPHIL # BLD AUTO: 0.05 K/MM3 (ref 0–0.68)
EOSINOPHIL NFR BLD AUTO: 1 % (ref 0–6)
ERYTHROCYTE [DISTWIDTH] IN BLOOD BY AUTOMATED COUNT: 14.6 % (ref 11.7–14.2)
GLUCOSE SERPL-MCNC: 115 MG/DL (ref 70–99)
HCT VFR BLD AUTO: 35.1 % (ref 33–51)
HGB BLD-MCNC: 11.5 G/DL (ref 11.5–16)
IMM GRANULOCYTES # BLD AUTO: 0.03 K/MM3 (ref 0–0.1)
IMM GRANULOCYTES NFR BLD AUTO: 0 % (ref 0–1)
LYMPHOCYTES # BLD AUTO: 2.52 K/MM3 (ref 0.84–5.2)
LYMPHOCYTES NFR BLD AUTO: 30 % (ref 21–46)
MCHC RBC AUTO-ENTMCNC: 32.8 G/DL (ref 31.5–36.5)
MCV RBC AUTO: 93 FL (ref 80–100)
MONOCYTES # BLD AUTO: 0.61 K/MM3 (ref 0.16–1.47)
MONOCYTES NFR BLD AUTO: 7 % (ref 4–13)
NEUTROPHILS # BLD AUTO: 5.21 K/MM3 (ref 1.96–9.15)
NEUTROPHILS NFR BLD AUTO: 62 % (ref 41–73)
NRBC # BLD AUTO: 0 K/MM3 (ref 0–0.02)
NRBC BLD AUTO-RTO: 0 /100 WBC (ref 0–0.2)
PLATELET # BLD AUTO: 252 K/MM3 (ref 150–400)
POTASSIUM SERPL-SCNC: 3.8 MMOL/L (ref 3.5–5.5)
SODIUM SERPL-SCNC: 140 MMOL/L (ref 136–145)

## 2023-10-25 NOTE — NUR
SHIFT SUMMARY
PT IS POD#1 FOR AN ELECTIVE RIGHT TOTAL KNEE ARTHROPLASTY PERFORMED ON
10/24/23. AT THE BEGINNING OF THE SHIFT, PT WAS EXTREMELY NAUSEOUS AND
VOMITED APPROX 650 MLS OF PINK-TINGED VOMIT ON TWO SEPARATE OCCASIONS. UNABLE
TO GIVE ANY ANTI-NAUSEA MEDS D/T TIME CONSTRAINTS ON MEDS (Q6H). PT REPORTED
FEELING BETTER AFTER VOMITING. PAIN WELL CONTROLLLED PER EMAR, UTILIZING BOTH
IV AND PO MEDICATIONS. PT ABLE TO VOID ON TWO SEPARATE OCCASIONS. PT USED THE
WALKER WELL AND WAS A 1 PERSON ASSIST WITH A GAIT BELT TO THE BEDSIDE COMMODE
BY STANDING AND PIVOTING. VSS THROUGHOUT SHIFT WITH NO OTHER ACUTE EVENTS
OCCURRING. HELD NOC SHIFT DOSES OF METOPROLOL AND IRBESARTAN D/T LOW HR AND
BP.

## 2023-10-25 NOTE — NUR
SHIFT SUMMARY
PT RECIEVED IVF T/O DAY TIL AFTERNOON. NO ACTIVE EMESIS OR DRY HEAVES BUT
CONTINUES TO HAVE NAUSEA. ABLE TO DRINK FLUIDS & EAT BITES OF FOOD. REPORTS
DIZZINESS HAS IMPROVED T/O SHIFT BUT IS STILL THERE WHEN AMBULATES. HAS
GRADUATED FROM USING BSC TO BATHROOM. URINE CLEAR. BM TODAY. HOPEFUL TO GO
HOME TOMORROW IF DIZZINESS RESOLVES.

## 2023-10-25 NOTE — NUR
Pt. is awake in bed and welcomes my visit. Pts. daughter is present at
bedside and is known to iliana  from the local community. Pt. is
pleasant.  Facilitated a life review and shared stories and common people
connections that allowed rapport to be established.  Pt. displayed evidence of
being engaged and aware.  Prayed with Pt. and her daughter. Both verbalized
gratitude for the spiritual care visit.

## 2023-10-26 VITALS — DIASTOLIC BLOOD PRESSURE: 70 MMHG | SYSTOLIC BLOOD PRESSURE: 152 MMHG

## 2023-10-26 VITALS — SYSTOLIC BLOOD PRESSURE: 132 MMHG | DIASTOLIC BLOOD PRESSURE: 64 MMHG

## 2023-10-26 NOTE — NUR
Pt. is awake in bed and welcomes my visit. Pt. is pleasant and rapport is
quickly re-established. Facilitated a life update. Pt. displays evidence of
being alert, aware, and engaged with her process of recovery.  Pt.
verbalized that she felt she was just waiting to see the doctor so she
could be discharged. Prayed with Pt.  Pt. verblized gratitude for the
spiritual care visit.

## 2023-10-26 NOTE — NUR
DISCHARGE
PT HAS CLEARED THERAPY. PAIN REASONABLY CONTROLLED. EATING, DRINKING, &
VOIDING WELL. REPORTS DIZZINESS & NAUSEA IS MINIMAL; FEELS COMFORTABLE
TO GO HOME. DR ROSA IN TO SEE PT AT LUNCH. ROSETTA, JOHN, & POLAR PACK SENT
w/ PT. ESCORTED OUT VIA W/C.

## 2023-10-26 NOTE — NUR
SHIFT SUMMARY
POD2 R TKA
PT ABLE TO REST T/O NIGHT. VOIDING. WALKING TO THE BATHROOM, NO DIZZINESS. PT
STATES NAUSEA IS BETTER THIS MORNING. PAIN MANAGED PER EMAR. DRESSING TO R
KNEE C/D/I. POLAR PAC ON. PLAN FOR DISCHARGE TODAY. NO OTHER CONCERNS AT THIS
TIME. CALL LIGHT WITHIN REACH

## 2023-10-29 ENCOUNTER — HOSPITAL ENCOUNTER (EMERGENCY)
Dept: HOSPITAL 95 - ER | Age: 83
LOS: 1 days | Discharge: HOME | End: 2023-10-30
Payer: MEDICARE

## 2023-10-29 VITALS — WEIGHT: 163.01 LBS | HEIGHT: 66 IN | BODY MASS INDEX: 26.2 KG/M2

## 2023-10-29 DIAGNOSIS — Z96.651: ICD-10-CM

## 2023-10-29 DIAGNOSIS — Z88.8: ICD-10-CM

## 2023-10-29 DIAGNOSIS — Z79.82: ICD-10-CM

## 2023-10-29 DIAGNOSIS — R05.9: Primary | ICD-10-CM

## 2023-10-29 DIAGNOSIS — E87.6: ICD-10-CM

## 2023-10-29 DIAGNOSIS — Z79.899: ICD-10-CM

## 2023-10-29 DIAGNOSIS — E03.9: ICD-10-CM

## 2023-10-29 DIAGNOSIS — I10: ICD-10-CM

## 2023-10-29 DIAGNOSIS — R06.02: ICD-10-CM

## 2023-10-29 LAB
ALBUMIN SERPL BCP-MCNC: 3.4 G/DL (ref 3.4–5)
ALBUMIN/GLOB SERPL: 1 {RATIO} (ref 0.8–1.8)
ALT SERPL W P-5'-P-CCNC: 14 U/L (ref 12–78)
ANION GAP SERPL CALCULATED.4IONS-SCNC: 8 MMOL/L (ref 6–16)
AST SERPL W P-5'-P-CCNC: 25 U/L (ref 12–37)
BASOPHILS # BLD AUTO: 0.04 K/MM3 (ref 0–0.23)
BASOPHILS NFR BLD AUTO: 0 % (ref 0–2)
BILIRUB SERPL-MCNC: 0.6 MG/DL (ref 0.1–1)
BUN SERPL-MCNC: 19 MG/DL (ref 8–24)
CALCIUM SERPL-MCNC: 9.4 MG/DL (ref 8.5–10.1)
CHLORIDE SERPL-SCNC: 102 MMOL/L (ref 98–108)
CO2 SERPL-SCNC: 27 MMOL/L (ref 21–32)
CREAT SERPL-MCNC: 0.93 MG/DL (ref 0.4–1)
DEPRECATED RDW RBC AUTO: 50.4 FL (ref 35.1–46.3)
EOSINOPHIL # BLD AUTO: 0.25 K/MM3 (ref 0–0.68)
EOSINOPHIL NFR BLD AUTO: 3 % (ref 0–6)
ERYTHROCYTE [DISTWIDTH] IN BLOOD BY AUTOMATED COUNT: 14.6 % (ref 11.7–14.2)
FLUAV RNA SPEC QL NAA+PROBE: NEGATIVE
FLUBV RNA SPEC QL NAA+PROBE: NEGATIVE
GLOBULIN SER CALC-MCNC: 3.4 G/DL (ref 2.2–4)
GLUCOSE SERPL-MCNC: 119 MG/DL (ref 70–99)
HCT VFR BLD AUTO: 31.5 % (ref 33–51)
HGB BLD-MCNC: 10.3 G/DL (ref 11.5–16)
IMM GRANULOCYTES # BLD AUTO: 0.03 K/MM3 (ref 0–0.1)
IMM GRANULOCYTES NFR BLD AUTO: 0 % (ref 0–1)
LYMPHOCYTES # BLD AUTO: 4.46 K/MM3 (ref 0.84–5.2)
LYMPHOCYTES NFR BLD AUTO: 46 % (ref 21–46)
MCHC RBC AUTO-ENTMCNC: 32.7 G/DL (ref 31.5–36.5)
MCV RBC AUTO: 94 FL (ref 80–100)
MONOCYTES # BLD AUTO: 0.82 K/MM3 (ref 0.16–1.47)
MONOCYTES NFR BLD AUTO: 9 % (ref 4–13)
NEUTROPHILS # BLD AUTO: 4.09 K/MM3 (ref 1.96–9.15)
NEUTROPHILS NFR BLD AUTO: 42 % (ref 41–73)
NRBC # BLD AUTO: 0 K/MM3 (ref 0–0.02)
NRBC BLD AUTO-RTO: 0 /100 WBC (ref 0–0.2)
PLATELET # BLD AUTO: 328 K/MM3 (ref 150–400)
POTASSIUM SERPL-SCNC: 3.4 MMOL/L (ref 3.5–5.5)
PROT SERPL-MCNC: 6.8 G/DL (ref 6.4–8.2)
RSV RNA SPEC QL NAA+PROBE: NEGATIVE
SARS-COV-2 RNA RESP QL NAA+PROBE: NEGATIVE
SODIUM SERPL-SCNC: 137 MMOL/L (ref 136–145)

## 2023-10-29 PROCEDURE — A9270 NON-COVERED ITEM OR SERVICE: HCPCS

## 2023-10-30 VITALS — SYSTOLIC BLOOD PRESSURE: 112 MMHG | DIASTOLIC BLOOD PRESSURE: 63 MMHG

## 2024-08-20 ENCOUNTER — HOSPITAL ENCOUNTER (EMERGENCY)
Dept: HOSPITAL 95 - ER | Age: 84
LOS: 1 days | Discharge: HOME | End: 2024-08-21
Payer: MEDICARE

## 2024-08-20 VITALS — DIASTOLIC BLOOD PRESSURE: 77 MMHG | SYSTOLIC BLOOD PRESSURE: 96 MMHG

## 2024-08-20 VITALS — BODY MASS INDEX: 24.91 KG/M2 | WEIGHT: 155.01 LBS | HEIGHT: 66 IN

## 2024-08-20 DIAGNOSIS — N39.0: Primary | ICD-10-CM

## 2024-08-20 DIAGNOSIS — G43.909: ICD-10-CM

## 2024-08-20 DIAGNOSIS — I10: ICD-10-CM

## 2024-08-20 DIAGNOSIS — Z79.899: ICD-10-CM

## 2024-08-20 DIAGNOSIS — Z79.82: ICD-10-CM

## 2024-08-20 DIAGNOSIS — E03.9: ICD-10-CM

## 2024-08-20 DIAGNOSIS — Z88.8: ICD-10-CM

## 2024-08-20 LAB
ALBUMIN SERPL BCP-MCNC: 3.6 G/DL (ref 3.4–5)
ALBUMIN/GLOB SERPL: 0.8 {RATIO} (ref 0.8–1.8)
ALT SERPL W P-5'-P-CCNC: 20 U/L (ref 12–78)
ANION GAP SERPL CALCULATED.4IONS-SCNC: 12 MMOL/L (ref 3–11)
AST SERPL W P-5'-P-CCNC: 27 U/L (ref 12–37)
BASOPHILS # BLD AUTO: 0.03 K/MM3 (ref 0–0.23)
BASOPHILS NFR BLD AUTO: 0 % (ref 0–2)
BILIRUB SERPL-MCNC: 1 MG/DL (ref 0.1–1)
BUN SERPL-MCNC: 10 MG/DL (ref 8–24)
CALCIUM SERPL-MCNC: 9.7 MG/DL (ref 8.5–10.1)
CHLORIDE SERPL-SCNC: 106 MMOL/L (ref 98–108)
CO2 SERPL-SCNC: 24 MMOL/L (ref 21–32)
CREAT SERPL-MCNC: 0.73 MG/DL (ref 0.4–1)
DEPRECATED RDW RBC AUTO: 47 FL (ref 35.1–46.3)
EOSINOPHIL # BLD AUTO: 0.01 K/MM3 (ref 0–0.68)
EOSINOPHIL NFR BLD AUTO: 0 % (ref 0–6)
ERYTHROCYTE [DISTWIDTH] IN BLOOD BY AUTOMATED COUNT: 14.2 % (ref 11.7–14.2)
FLUAV RNA SPEC QL NAA+PROBE: NEGATIVE
FLUBV RNA SPEC QL NAA+PROBE: NEGATIVE
GLOBULIN SER CALC-MCNC: 4.4 G/DL (ref 2.2–4)
GLUCOSE SERPL-MCNC: 126 MG/DL (ref 70–99)
HCT VFR BLD AUTO: 41 % (ref 33–51)
HGB BLD-MCNC: 13.6 G/DL (ref 11.5–16)
IMM GRANULOCYTES # BLD AUTO: 0.03 K/MM3 (ref 0–0.1)
IMM GRANULOCYTES NFR BLD AUTO: 0 % (ref 0–1)
KETONES UR STRIP-MCNC: (no result) MG/DL
LEUKOCYTE ESTERASE UR QL STRIP: (no result)
LYMPHOCYTES # BLD AUTO: 1.37 K/MM3 (ref 0.84–5.2)
LYMPHOCYTES NFR BLD AUTO: 12 % (ref 21–46)
MCHC RBC AUTO-ENTMCNC: 33.2 G/DL (ref 31.5–36.5)
MCV RBC AUTO: 91 FL (ref 80–100)
MONOCYTES # BLD AUTO: 0.93 K/MM3 (ref 0.16–1.47)
MONOCYTES NFR BLD AUTO: 8 % (ref 4–13)
NEUTROPHILS # BLD AUTO: 9.32 K/MM3 (ref 1.96–9.15)
NEUTROPHILS NFR BLD AUTO: 80 % (ref 41–73)
NRBC # BLD AUTO: 0 K/MM3 (ref 0–0.02)
NRBC BLD AUTO-RTO: 0 /100 WBC (ref 0–0.2)
PLATELET # BLD AUTO: 243 K/MM3 (ref 150–400)
POTASSIUM SERPL-SCNC: 3.4 MMOL/L (ref 3.5–5.5)
PROT SERPL-MCNC: 8 G/DL (ref 6.4–8.2)
PROT UR STRIP-MCNC: (no result) MG/DL
RBC #/AREA URNS HPF: (no result) /HPF (ref 0–2)
RSV RNA SPEC QL NAA+PROBE: NEGATIVE
SARS-COV-2 RNA RESP QL NAA+PROBE: NEGATIVE
SODIUM SERPL-SCNC: 139 MMOL/L (ref 136–145)
SP GR SPEC: 1.01 (ref 1–1.02)
UROBILINOGEN UR STRIP-MCNC: (no result) MG/DL
WBC #/AREA URNS HPF: (no result) /HPF (ref 0–5)

## 2024-08-20 PROCEDURE — P9612 CATHETERIZE FOR URINE SPEC: HCPCS

## 2024-08-20 PROCEDURE — A9270 NON-COVERED ITEM OR SERVICE: HCPCS

## 2024-09-01 ENCOUNTER — HOSPITAL ENCOUNTER (OUTPATIENT)
Dept: HOSPITAL 95 - LAB SHORT | Age: 84
Discharge: HOME | End: 2024-09-01
Attending: NURSE PRACTITIONER
Payer: MEDICARE

## 2024-09-01 DIAGNOSIS — R30.0: Primary | ICD-10-CM

## 2024-09-01 DIAGNOSIS — R35.0: ICD-10-CM

## 2024-10-25 ENCOUNTER — HOSPITAL ENCOUNTER (OUTPATIENT)
Dept: HOSPITAL 95 - LAB SHORT | Age: 84
End: 2024-10-25
Payer: MEDICARE

## 2024-10-25 DIAGNOSIS — R35.0: ICD-10-CM

## 2024-10-25 DIAGNOSIS — R30.0: Primary | ICD-10-CM
